# Patient Record
Sex: MALE | Race: WHITE | Employment: FULL TIME | URBAN - METROPOLITAN AREA
[De-identification: names, ages, dates, MRNs, and addresses within clinical notes are randomized per-mention and may not be internally consistent; named-entity substitution may affect disease eponyms.]

---

## 2020-02-02 ENCOUNTER — HOSPITAL ENCOUNTER (OUTPATIENT)
Age: 55
Discharge: HOME | End: 2020-02-02
Attending: EMERGENCY MEDICINE
Payer: OTHER MISCELLANEOUS

## 2020-02-02 ENCOUNTER — APPOINTMENT (OUTPATIENT)
Dept: GENERAL RADIOLOGY | Age: 55
End: 2020-02-02
Attending: EMERGENCY MEDICINE
Payer: OTHER MISCELLANEOUS

## 2020-02-02 VITALS
BODY MASS INDEX: 31.39 KG/M2 | SYSTOLIC BLOOD PRESSURE: 115 MMHG | OXYGEN SATURATION: 97 % | WEIGHT: 200 LBS | HEART RATE: 73 BPM | TEMPERATURE: 98 F | DIASTOLIC BLOOD PRESSURE: 84 MMHG | HEIGHT: 67 IN | RESPIRATION RATE: 18 BRPM

## 2020-02-02 DIAGNOSIS — S61.111A LACERATION OF RIGHT THUMB WITHOUT FOREIGN BODY WITH DAMAGE TO NAIL, INITIAL ENCOUNTER: Primary | ICD-10-CM

## 2020-02-02 PROCEDURE — 90471 IMMUNIZATION ADMIN: CPT

## 2020-02-02 PROCEDURE — 73140 X-RAY EXAM OF FINGER(S): CPT | Performed by: EMERGENCY MEDICINE

## 2020-02-02 PROCEDURE — 64450 NJX AA&/STRD OTHER PN/BRANCH: CPT

## 2020-02-02 PROCEDURE — 99203 OFFICE O/P NEW LOW 30 MIN: CPT

## 2020-02-02 RX ORDER — NAPROXEN 500 MG/1
500 TABLET ORAL 2 TIMES DAILY WITH MEALS
COMMUNITY

## 2020-02-02 NOTE — ED PROVIDER NOTES
Patient Seen in: [address] [facility]      History   Patient presents with:  Upper Extremity Injury    Stated Complaint: w/c smashed finger today    HPI    Patient states that he injured his right thumb by smashing it in an oven at w Reviewed - No data to display    ED Course as of Feb 02 1410  ------------------------------------------------------------  Time: 02/02 1410  Value: XR FINGER(S) (MIN 2 VIEWS), RIGHT THUMB (CPT=73356) Once  Comment: Unremarkable       Patient's right thumb

## 2020-02-03 ENCOUNTER — APPOINTMENT (OUTPATIENT)
Dept: OTHER | Facility: HOSPITAL | Age: 55
End: 2020-02-03
Attending: PREVENTIVE MEDICINE

## 2020-02-10 ENCOUNTER — APPOINTMENT (OUTPATIENT)
Dept: OTHER | Facility: HOSPITAL | Age: 55
End: 2020-02-10
Attending: PREVENTIVE MEDICINE

## 2020-02-14 ENCOUNTER — APPOINTMENT (OUTPATIENT)
Dept: OTHER | Facility: HOSPITAL | Age: 55
End: 2020-02-14
Attending: PREVENTIVE MEDICINE

## 2020-02-27 ENCOUNTER — APPOINTMENT (OUTPATIENT)
Dept: OTHER | Facility: HOSPITAL | Age: 55
End: 2020-02-27
Attending: PREVENTIVE MEDICINE

## 2021-07-19 ENCOUNTER — IMAGING SERVICES (OUTPATIENT)
Dept: GENERAL RADIOLOGY | Age: 56
End: 2021-07-19
Attending: PHYSICIAN ASSISTANT

## 2021-07-19 DIAGNOSIS — S60.222A CONTUSION OF LEFT HAND, INITIAL ENCOUNTER: ICD-10-CM

## 2021-07-19 DIAGNOSIS — S80.02XA CONTUSION OF LEFT KNEE, INITIAL ENCOUNTER: Primary | ICD-10-CM

## 2021-07-19 DIAGNOSIS — S80.12XA CONTUSION OF LEFT LOWER EXTREMITY, INITIAL ENCOUNTER: ICD-10-CM

## 2021-07-19 DIAGNOSIS — S80.02XA CONTUSION OF LEFT KNEE, INITIAL ENCOUNTER: ICD-10-CM

## 2021-07-19 DIAGNOSIS — S60.222A CONTUSION OF LEFT HAND, INITIAL ENCOUNTER: Primary | ICD-10-CM

## 2021-07-19 PROCEDURE — 73590 X-RAY EXAM OF LOWER LEG: CPT | Performed by: RADIOLOGY

## 2021-07-19 PROCEDURE — 73564 X-RAY EXAM KNEE 4 OR MORE: CPT | Performed by: RADIOLOGY

## 2021-07-19 PROCEDURE — 73130 X-RAY EXAM OF HAND: CPT | Performed by: RADIOLOGY

## 2021-08-02 DIAGNOSIS — S60.222A CONTUSION OF LEFT HAND: Primary | ICD-10-CM

## 2021-08-30 ENCOUNTER — WORKER'S COMP (OUTPATIENT)
Dept: OCCUPATIONAL THERAPY | Age: 56
End: 2021-08-30
Attending: PHYSICIAN ASSISTANT

## 2021-08-30 DIAGNOSIS — S60.222A CONTUSION OF LEFT HAND, INITIAL ENCOUNTER: Primary | ICD-10-CM

## 2021-08-30 DIAGNOSIS — S80.12XA CONTUSION OF LEFT LEG: Primary | ICD-10-CM

## 2021-08-30 DIAGNOSIS — M79.642 LEFT HAND PAIN: ICD-10-CM

## 2021-08-30 PROCEDURE — 97165 OT EVAL LOW COMPLEX 30 MIN: CPT | Performed by: OCCUPATIONAL THERAPIST

## 2021-08-30 ASSESSMENT — ENCOUNTER SYMPTOMS
ALLEVIATING FACTORS: REST
PAIN SCALE AT HIGHEST: 8
QUALITY: CRAMPING
PAIN SEVERITY NOW: 4

## 2021-09-01 ENCOUNTER — WORKER'S COMP (OUTPATIENT)
Dept: OCCUPATIONAL THERAPY | Age: 56
End: 2021-09-01

## 2021-09-01 ENCOUNTER — TELEPHONE (OUTPATIENT)
Dept: PHYSICAL THERAPY | Age: 56
End: 2021-09-01

## 2021-09-01 DIAGNOSIS — S60.222A CONTUSION OF LEFT HAND, INITIAL ENCOUNTER: ICD-10-CM

## 2021-09-01 DIAGNOSIS — M79.642 LEFT HAND PAIN: Primary | ICD-10-CM

## 2021-09-01 PROCEDURE — 97110 THERAPEUTIC EXERCISES: CPT | Performed by: OCCUPATIONAL THERAPIST

## 2021-09-01 PROCEDURE — 97140 MANUAL THERAPY 1/> REGIONS: CPT | Performed by: OCCUPATIONAL THERAPIST

## 2021-09-01 PROCEDURE — 97112 NEUROMUSCULAR REEDUCATION: CPT | Performed by: OCCUPATIONAL THERAPIST

## 2021-09-01 PROCEDURE — 97530 THERAPEUTIC ACTIVITIES: CPT | Performed by: OCCUPATIONAL THERAPIST

## 2021-09-02 ENCOUNTER — WORKER'S COMP (OUTPATIENT)
Dept: PHYSICAL THERAPY | Age: 56
End: 2021-09-02
Attending: PHYSICIAN ASSISTANT

## 2021-09-02 DIAGNOSIS — S80.12XD CONTUSION OF LEFT LOWER EXTREMITY, SUBSEQUENT ENCOUNTER: Primary | ICD-10-CM

## 2021-09-02 PROBLEM — S80.12XA CONTUSION OF LEFT LEG: Status: ACTIVE | Noted: 2021-09-02

## 2021-09-02 PROCEDURE — 97112 NEUROMUSCULAR REEDUCATION: CPT | Performed by: PHYSICAL THERAPIST

## 2021-09-02 PROCEDURE — 97110 THERAPEUTIC EXERCISES: CPT | Performed by: PHYSICAL THERAPIST

## 2021-09-02 PROCEDURE — 97161 PT EVAL LOW COMPLEX 20 MIN: CPT | Performed by: PHYSICAL THERAPIST

## 2021-09-02 ASSESSMENT — MOVEMENT AND STRENGTH ASSESSMENTS
GETTING INTO OR OUT OF THE BATH: A LITTLE BIT OF DIFFICULTY
WALKING BETWEEN ROOMS: A LITTLE BIT OF DIFFICULTY
WALKING 2 BLOCKS: QUITE A BIT OF DIFFICULTY
HOPPING: QUITE A BIT OF DIFFICULTY
TOTAL SCORE: 45
PERFORMING LIGHT ACTIVITES AROUND YOUR HOME: MODERATE DIFFICULTY
SITTING FOR 1 HOUR: A LITTLE BIT OF DIFFICULTY
RUNNING ON EVEN GROUND: QUITE A BIT OF DIFFICULTY
PERFORMING HEAVY ACTIVITIES AROUND YOUR HOME: QUITE A BIT OF DIFFICULTY
STANDING FOR 1 HOUR: MODERATE DIFFICULTY
WALKING A MILE: QUITE A BIT OF DIFFICULTY
RUNNING ON UNEVEN GROUND: QUITE A BIT OF DIFFICULTY
MAKING SHARP TURNS WHILE RUNNING FAST: QUITE A BIT OF DIFFICULTY
SQUATTING: QUITE A BIT OF DIFFICULTY
YOUR USUAL HOBBIES, RECREATIONAL OR SPORTING ACTIVIITIES: A LITTLE BIT OF DIFFICULTY
PUTTING ON YOUR SHOES OR SOCKS: MODERATE DIFFICULTY
ROLLING OVER IN BED: MODERATE DIFFICULTY
GOING UP OR DOWN 10 STAIRS (ABOUT 1 FLIGHT OF STAIRS): MODERATE DIFFICULTY
ANY OF YOUR USUAL WORK, HOUSEWORK OR SCHOOL ACTIVITIES: MODERATE DIFFICULTY
GETTING INTO OR OUT OF A CAR: MODERATE DIFFICULTY
LIFTING AN OBJECT, LIKE A BAG OF GROCERIES, FROM THE FLOOR: MODERATE DIFFICULTY

## 2021-09-02 ASSESSMENT — ENCOUNTER SYMPTOMS
PAIN SCALE AT LOWEST: 0
PAIN SCALE AT HIGHEST: 5
SUBJECTIVE PAIN PROGRESSION: IMPROVED
PAIN SEVERITY NOW: 1
ALLEVIATING FACTOR: SEE NARRATIVE FOR MORE DETAILS
ALLEVIATING FACTORS: AVOIDING MOVEMENT IN INVOLVED AREA
PAIN DESCRIPTION: SEE NARRATIVE FOR MORE DETAILS
PAIN LOCATION: SEE NARRATIVE FOR MORE DETAILS
QUALITY: ACHE

## 2021-09-07 ENCOUNTER — WORKER'S COMP (OUTPATIENT)
Dept: OCCUPATIONAL THERAPY | Age: 56
End: 2021-09-07

## 2021-09-07 DIAGNOSIS — S60.222A CONTUSION OF LEFT HAND, INITIAL ENCOUNTER: ICD-10-CM

## 2021-09-07 DIAGNOSIS — M79.642 LEFT HAND PAIN: Primary | ICD-10-CM

## 2021-09-07 PROCEDURE — 97112 NEUROMUSCULAR REEDUCATION: CPT | Performed by: OCCUPATIONAL THERAPIST

## 2021-09-07 PROCEDURE — 97530 THERAPEUTIC ACTIVITIES: CPT | Performed by: OCCUPATIONAL THERAPIST

## 2021-09-07 PROCEDURE — 97110 THERAPEUTIC EXERCISES: CPT | Performed by: OCCUPATIONAL THERAPIST

## 2021-09-07 PROCEDURE — 97140 MANUAL THERAPY 1/> REGIONS: CPT | Performed by: OCCUPATIONAL THERAPIST

## 2021-09-08 ENCOUNTER — WORKER'S COMP (OUTPATIENT)
Dept: PHYSICAL THERAPY | Age: 56
End: 2021-09-08

## 2021-09-08 DIAGNOSIS — S80.12XD CONTUSION OF LEFT LOWER EXTREMITY, SUBSEQUENT ENCOUNTER: Primary | ICD-10-CM

## 2021-09-08 PROCEDURE — 97530 THERAPEUTIC ACTIVITIES: CPT | Performed by: PHYSICAL THERAPIST

## 2021-09-08 PROCEDURE — 97110 THERAPEUTIC EXERCISES: CPT | Performed by: PHYSICAL THERAPIST

## 2021-09-08 PROCEDURE — 97112 NEUROMUSCULAR REEDUCATION: CPT | Performed by: PHYSICAL THERAPIST

## 2021-09-09 ENCOUNTER — WORKER'S COMP (OUTPATIENT)
Dept: OCCUPATIONAL THERAPY | Age: 56
End: 2021-09-09

## 2021-09-09 DIAGNOSIS — S60.222A CONTUSION OF LEFT HAND, INITIAL ENCOUNTER: ICD-10-CM

## 2021-09-09 DIAGNOSIS — M79.642 LEFT HAND PAIN: Primary | ICD-10-CM

## 2021-09-09 PROCEDURE — 97140 MANUAL THERAPY 1/> REGIONS: CPT | Performed by: OCCUPATIONAL THERAPIST

## 2021-09-09 PROCEDURE — 97110 THERAPEUTIC EXERCISES: CPT | Performed by: OCCUPATIONAL THERAPIST

## 2021-09-09 PROCEDURE — 97112 NEUROMUSCULAR REEDUCATION: CPT | Performed by: OCCUPATIONAL THERAPIST

## 2021-09-09 PROCEDURE — 97530 THERAPEUTIC ACTIVITIES: CPT | Performed by: OCCUPATIONAL THERAPIST

## 2021-09-13 ENCOUNTER — WORKER'S COMP (OUTPATIENT)
Dept: OCCUPATIONAL THERAPY | Age: 56
End: 2021-09-13

## 2021-09-13 ENCOUNTER — WORKER'S COMP (OUTPATIENT)
Dept: PHYSICAL THERAPY | Age: 56
End: 2021-09-13

## 2021-09-13 DIAGNOSIS — S60.222A CONTUSION OF LEFT HAND, INITIAL ENCOUNTER: ICD-10-CM

## 2021-09-13 DIAGNOSIS — M79.642 LEFT HAND PAIN: Primary | ICD-10-CM

## 2021-09-13 PROCEDURE — 97110 THERAPEUTIC EXERCISES: CPT | Performed by: OCCUPATIONAL THERAPIST

## 2021-09-13 PROCEDURE — 97112 NEUROMUSCULAR REEDUCATION: CPT | Performed by: OCCUPATIONAL THERAPIST

## 2021-09-13 PROCEDURE — 97140 MANUAL THERAPY 1/> REGIONS: CPT | Performed by: OCCUPATIONAL THERAPIST

## 2021-09-13 PROCEDURE — 97530 THERAPEUTIC ACTIVITIES: CPT | Performed by: PHYSICAL THERAPIST

## 2021-09-13 PROCEDURE — 97530 THERAPEUTIC ACTIVITIES: CPT | Performed by: OCCUPATIONAL THERAPIST

## 2021-09-13 PROCEDURE — 97112 NEUROMUSCULAR REEDUCATION: CPT | Performed by: PHYSICAL THERAPIST

## 2021-09-13 ASSESSMENT — ENCOUNTER SYMPTOMS: PAIN SEVERITY NOW: 0

## 2021-09-15 ENCOUNTER — WORKER'S COMP (OUTPATIENT)
Dept: OCCUPATIONAL THERAPY | Age: 56
End: 2021-09-15

## 2021-09-15 DIAGNOSIS — S60.222A CONTUSION OF LEFT HAND, INITIAL ENCOUNTER: ICD-10-CM

## 2021-09-15 DIAGNOSIS — M79.642 LEFT HAND PAIN: Primary | ICD-10-CM

## 2021-09-15 PROCEDURE — 97140 MANUAL THERAPY 1/> REGIONS: CPT | Performed by: OCCUPATIONAL THERAPIST

## 2021-09-15 PROCEDURE — 97112 NEUROMUSCULAR REEDUCATION: CPT | Performed by: OCCUPATIONAL THERAPIST

## 2021-09-15 PROCEDURE — 97530 THERAPEUTIC ACTIVITIES: CPT | Performed by: OCCUPATIONAL THERAPIST

## 2021-09-16 ENCOUNTER — WORKER'S COMP (OUTPATIENT)
Dept: PHYSICAL THERAPY | Age: 56
End: 2021-09-16

## 2021-09-16 DIAGNOSIS — S80.12XD CONTUSION OF LEFT LOWER EXTREMITY, SUBSEQUENT ENCOUNTER: Primary | ICD-10-CM

## 2021-09-16 PROCEDURE — 97112 NEUROMUSCULAR REEDUCATION: CPT | Performed by: PHYSICAL THERAPIST

## 2021-09-16 PROCEDURE — 97530 THERAPEUTIC ACTIVITIES: CPT | Performed by: PHYSICAL THERAPIST

## 2021-09-16 ASSESSMENT — ENCOUNTER SYMPTOMS
PAIN SEVERITY NOW: 0
PAIN SCALE AT HIGHEST: 0
PAIN SCALE AT LOWEST: 0
SUBJECTIVE PAIN PROGRESSION: IMPROVED

## 2021-09-20 ENCOUNTER — WORKER'S COMP (OUTPATIENT)
Dept: OCCUPATIONAL THERAPY | Age: 56
End: 2021-09-20

## 2021-09-20 DIAGNOSIS — M79.642 LEFT HAND PAIN: Primary | ICD-10-CM

## 2021-09-20 DIAGNOSIS — S60.222A CONTUSION OF LEFT HAND, INITIAL ENCOUNTER: ICD-10-CM

## 2021-09-20 PROCEDURE — 97530 THERAPEUTIC ACTIVITIES: CPT | Performed by: OCCUPATIONAL THERAPIST

## 2021-09-20 PROCEDURE — 97140 MANUAL THERAPY 1/> REGIONS: CPT | Performed by: OCCUPATIONAL THERAPIST

## 2021-09-20 PROCEDURE — 97112 NEUROMUSCULAR REEDUCATION: CPT | Performed by: OCCUPATIONAL THERAPIST

## 2021-09-21 ENCOUNTER — WORKER'S COMP (OUTPATIENT)
Dept: PHYSICAL THERAPY | Age: 56
End: 2021-09-21

## 2021-09-21 PROCEDURE — 97140 MANUAL THERAPY 1/> REGIONS: CPT | Performed by: PHYSICAL THERAPIST

## 2021-09-21 PROCEDURE — 97112 NEUROMUSCULAR REEDUCATION: CPT | Performed by: PHYSICAL THERAPIST

## 2021-09-21 PROCEDURE — 97530 THERAPEUTIC ACTIVITIES: CPT | Performed by: PHYSICAL THERAPIST

## 2021-09-21 ASSESSMENT — ENCOUNTER SYMPTOMS
ALLEVIATING FACTORS: CHANGE IN POSITION
QUALITY: TINGLING
SUBJECTIVE PAIN PROGRESSION: NO CHANGE

## 2021-09-23 ENCOUNTER — WORKER'S COMP (OUTPATIENT)
Dept: PHYSICAL THERAPY | Age: 56
End: 2021-09-23

## 2021-09-23 DIAGNOSIS — S80.12XD CONTUSION OF LEFT LOWER EXTREMITY, SUBSEQUENT ENCOUNTER: Primary | ICD-10-CM

## 2021-09-23 PROCEDURE — 97112 NEUROMUSCULAR REEDUCATION: CPT | Performed by: PHYSICAL THERAPIST

## 2021-09-23 PROCEDURE — 97530 THERAPEUTIC ACTIVITIES: CPT | Performed by: PHYSICAL THERAPIST

## 2021-09-27 ENCOUNTER — WORKER'S COMP (OUTPATIENT)
Dept: PHYSICAL THERAPY | Age: 56
End: 2021-09-27

## 2021-09-27 ENCOUNTER — WORKER'S COMP (OUTPATIENT)
Dept: OCCUPATIONAL THERAPY | Age: 56
End: 2021-09-27

## 2021-09-27 DIAGNOSIS — M79.642 LEFT HAND PAIN: Primary | ICD-10-CM

## 2021-09-27 DIAGNOSIS — S60.222A CONTUSION OF LEFT HAND, INITIAL ENCOUNTER: ICD-10-CM

## 2021-09-27 DIAGNOSIS — S80.12XD CONTUSION OF LEFT LOWER EXTREMITY, SUBSEQUENT ENCOUNTER: Primary | ICD-10-CM

## 2021-09-27 PROCEDURE — 97110 THERAPEUTIC EXERCISES: CPT | Performed by: OCCUPATIONAL THERAPIST

## 2021-09-27 PROCEDURE — 97530 THERAPEUTIC ACTIVITIES: CPT | Performed by: PHYSICAL THERAPIST

## 2021-09-27 PROCEDURE — 97112 NEUROMUSCULAR REEDUCATION: CPT | Performed by: OCCUPATIONAL THERAPIST

## 2021-09-27 PROCEDURE — 97140 MANUAL THERAPY 1/> REGIONS: CPT | Performed by: OCCUPATIONAL THERAPIST

## 2021-09-27 PROCEDURE — 97112 NEUROMUSCULAR REEDUCATION: CPT | Performed by: PHYSICAL THERAPIST

## 2021-09-27 ASSESSMENT — ENCOUNTER SYMPTOMS
PAIN SCALE AT HIGHEST: 2
ALLEVIATING FACTORS: UNABLE TO STATE ANYTHING THAT HELPS REDUCE THE PAIN

## 2021-09-29 ENCOUNTER — WORKER'S COMP (OUTPATIENT)
Dept: PHYSICAL THERAPY | Age: 56
End: 2021-09-29

## 2021-09-29 DIAGNOSIS — S80.12XD CONTUSION OF LEFT LOWER EXTREMITY, SUBSEQUENT ENCOUNTER: Primary | ICD-10-CM

## 2021-09-29 PROCEDURE — 97112 NEUROMUSCULAR REEDUCATION: CPT | Performed by: PHYSICAL THERAPIST

## 2021-09-29 PROCEDURE — 97110 THERAPEUTIC EXERCISES: CPT | Performed by: PHYSICAL THERAPIST

## 2021-09-29 PROCEDURE — 97140 MANUAL THERAPY 1/> REGIONS: CPT | Performed by: PHYSICAL THERAPIST

## 2021-09-29 ASSESSMENT — MOVEMENT AND STRENGTH ASSESSMENTS
YOUR USUAL HOBBIES, RECREATIONAL OR SPORTING ACTIVIITIES: MODERATE DIFFICULTY
GETTING INTO OR OUT OF THE BATH: NO DIFFICULTY
SQUATTING: NO DIFFICULTY
HOPPING: NO DIFFICULTY
WALKING BETWEEN ROOMS: NO DIFFICULTY
TOTAL SCORE: 86.25
MAKING SHARP TURNS WHILE RUNNING FAST: MODERATE DIFFICULTY
ROLLING OVER IN BED: NO DIFFICULTY
LIFTING AN OBJECT, LIKE A BAG OF GROCERIES, FROM THE FLOOR: A LITTLE BIT OF DIFFICULTY
WALKING 2 BLOCKS: NO DIFFICULTY
GETTING INTO OR OUT OF A CAR: NO DIFFICULTY
PUTTING ON YOUR SHOES OR SOCKS: NO DIFFICULTY
ANY OF YOUR USUAL WORK, HOUSEWORK OR SCHOOL ACTIVITIES: A LITTLE BIT OF DIFFICULTY
SITTING FOR 1 HOUR: NO DIFFICULTY
PERFORMING LIGHT ACTIVITES AROUND YOUR HOME: NO DIFFICULTY
RUNNING ON EVEN GROUND: A LITTLE BIT OF DIFFICULTY
RUNNING ON UNEVEN GROUND: A LITTLE BIT OF DIFFICULTY
PERFORMING HEAVY ACTIVITIES AROUND YOUR HOME: A LITTLE BIT OF DIFFICULTY
GOING UP OR DOWN 10 STAIRS (ABOUT 1 FLIGHT OF STAIRS): NO DIFFICULTY
WALKING A MILE: A LITTLE BIT OF DIFFICULTY
STANDING FOR 1 HOUR: A LITTLE BIT OF DIFFICULTY

## (undated) NOTE — LETTER
Date & Time: 2/2/2020, 1:31 PM  Patient: Nahed Beaulieu  Encounter Provider(s):    Josue Caba MD       To Whom It May Concern:    Nahed Beaulieu was seen and treated in our department on 2/2/2020.  He can return to work with these limitations: [place]ro Shah